# Patient Record
Sex: FEMALE | Race: WHITE | NOT HISPANIC OR LATINO | URBAN - METROPOLITAN AREA
[De-identification: names, ages, dates, MRNs, and addresses within clinical notes are randomized per-mention and may not be internally consistent; named-entity substitution may affect disease eponyms.]

---

## 2017-06-27 ENCOUNTER — IMPORTED ENCOUNTER (OUTPATIENT)
Dept: URBAN - METROPOLITAN AREA CLINIC 50 | Facility: CLINIC | Age: 67
End: 2017-06-27

## 2020-08-19 ENCOUNTER — IMPORTED ENCOUNTER (OUTPATIENT)
Dept: URBAN - METROPOLITAN AREA CLINIC 50 | Facility: CLINIC | Age: 70
End: 2020-08-19

## 2020-08-21 ENCOUNTER — IMPORTED ENCOUNTER (OUTPATIENT)
Dept: URBAN - METROPOLITAN AREA CLINIC 50 | Facility: CLINIC | Age: 70
End: 2020-08-21

## 2020-08-24 ENCOUNTER — IMPORTED ENCOUNTER (OUTPATIENT)
Dept: URBAN - METROPOLITAN AREA CLINIC 50 | Facility: CLINIC | Age: 70
End: 2020-08-24

## 2020-09-02 ENCOUNTER — IMPORTED ENCOUNTER (OUTPATIENT)
Dept: URBAN - METROPOLITAN AREA CLINIC 50 | Facility: CLINIC | Age: 70
End: 2020-09-02

## 2020-09-02 NOTE — PATIENT DISCUSSION
"""S/P IOL OS: [place] ZCB00 24.0 (ORA) +ORA/Femto/Arcs +Omidria. Continue post operative instructions and drops per schedule.  """

## 2020-09-08 ENCOUNTER — IMPORTED ENCOUNTER (OUTPATIENT)
Dept: URBAN - METROPOLITAN AREA CLINIC 50 | Facility: CLINIC | Age: 70
End: 2020-09-08

## 2020-09-16 ENCOUNTER — IMPORTED ENCOUNTER (OUTPATIENT)
Dept: URBAN - METROPOLITAN AREA CLINIC 50 | Facility: CLINIC | Age: 70
End: 2020-09-16

## 2020-09-21 ENCOUNTER — IMPORTED ENCOUNTER (OUTPATIENT)
Dept: URBAN - METROPOLITAN AREA CLINIC 50 | Facility: CLINIC | Age: 70
End: 2020-09-21

## 2020-09-21 NOTE — PATIENT DISCUSSION
"""S/P IOL OD: [place] [id number] 22.5 @ 80Âº +Femto +Omidria. Continue post operative instructions and drops per schedule.  """

## 2020-10-14 ENCOUNTER — IMPORTED ENCOUNTER (OUTPATIENT)
Dept: URBAN - METROPOLITAN AREA CLINIC 50 | Facility: CLINIC | Age: 70
End: 2020-10-14

## 2021-01-20 ENCOUNTER — IMPORTED ENCOUNTER (OUTPATIENT)
Dept: URBAN - METROPOLITAN AREA CLINIC 50 | Facility: CLINIC | Age: 71
End: 2021-01-20

## 2021-02-01 ENCOUNTER — IMPORTED ENCOUNTER (OUTPATIENT)
Dept: URBAN - METROPOLITAN AREA CLINIC 50 | Facility: CLINIC | Age: 71
End: 2021-02-01

## 2021-02-08 ENCOUNTER — IMPORTED ENCOUNTER (OUTPATIENT)
Dept: URBAN - METROPOLITAN AREA CLINIC 50 | Facility: CLINIC | Age: 71
End: 2021-02-08

## 2021-03-10 ENCOUNTER — IMPORTED ENCOUNTER (OUTPATIENT)
Dept: URBAN - METROPOLITAN AREA CLINIC 50 | Facility: CLINIC | Age: 71
End: 2021-03-10

## 2021-04-17 ASSESSMENT — TONOMETRY
OS_IOP_MMHG: 21
OS_IOP_MMHG: 19
OS_IOP_MMHG: 36
OD_IOP_MMHG: 17
OD_IOP_MMHG: 21
OS_IOP_MMHG: 18
OD_IOP_MMHG: 14
OS_IOP_MMHG: 18
OD_IOP_MMHG: 22
OD_IOP_MMHG: 21
OD_IOP_MMHG: 17
OS_IOP_MMHG: 7
OS_IOP_MMHG: 15
OS_IOP_MMHG: 14
OS_IOP_MMHG: 15
OS_IOP_MMHG: 16
OS_IOP_MMHG: 19
OD_IOP_MMHG: 20
OS_IOP_MMHG: 19
OD_IOP_MMHG: 18
OD_IOP_MMHG: 15
OD_IOP_MMHG: 17
OD_IOP_MMHG: 27

## 2021-04-17 ASSESSMENT — VISUAL ACUITY
OS_OTHER: 20/80. 20/400.
OD_OTHER: 20/30. 20/50.
OD_BAT: 20/30
OD_CC: 20/25-1
OS_CC: 20/25-
OD_BAT: 20/80
OD_OTHER: 20/60-. 20/100.
OD_OTHER: 20/60. >20/400.
OS_SC: 20/25-
OS_CC: J2@ 13 IN
OD_SC: 20/20
OD_CC: J1+@ 16 IN
OD_SC: 20/25+2
OD_CC: 20/25+2
OS_OTHER: 20/30. 20/60.
OS_SC: 20/30
OD_CC: J1
OD_SC: 20/20-1
OS_BAT: 20/80
OD_PH: @ 13 IN
OD_CC: 20/20-
OS_BAT: 20/80
OD_CC: J1+@ 14 IN
OD_SC: 20/40-1
OS_SC: 20/25
OS_OTHER: 20/80. 20/80.
OS_OTHER: 20/60. >20/400.
OD_CC: J1+
OS_BAT: 20/70-
OS_OTHER: 20/80. 20/80.
OD_OTHER: 20/80. 20/400.
OS_CC: 20/25
OD_SC: 20/30+
OS_CC: 20/50
OS_CC: J1+@ 14 IN
OS_CC: J1+@ 16 IN
OS_PH: @ 13 IN
OS_CC: J1
OD_CC: J2@ 13 IN
OD_BAT: 20/80
OD_OTHER: 20/80. 20/400.
OD_OTHER: 20/30. 20/50.
OD_OTHER: 20/80. 20/400.
OD_BAT: 20/60-
OS_OTHER: 20/70-. 20/200.
OD_BAT: 20/30
OS_CC: 20/25 BLURRY
OS_BAT: 20/30
OS_BAT: 20/30
OD_CC: 20/30+-
OS_BAT: 20/60
OD_BAT: 20/60
OS_CC: 20/40
OS_CC: J1+
OS_CC: 20/30-2
OS_BAT: 20/80
OD_BAT: 20/80
OS_OTHER: 20/30. 20/60.
OD_CC: 20/25
OS_CC: 20/40
OD_CC: 20/25

## 2021-09-27 ENCOUNTER — PROBLEM (OUTPATIENT)
Dept: URBAN - METROPOLITAN AREA CLINIC 49 | Facility: CLINIC | Age: 71
End: 2021-09-27

## 2021-09-27 DIAGNOSIS — H43.21: ICD-10-CM

## 2021-09-27 DIAGNOSIS — H35.372: ICD-10-CM

## 2021-09-27 DIAGNOSIS — H04.123: ICD-10-CM

## 2021-09-27 PROCEDURE — 92014 COMPRE OPH EXAM EST PT 1/>: CPT

## 2021-09-27 PROCEDURE — 92134 CPTRZ OPH DX IMG PST SGM RTA: CPT

## 2021-09-27 ASSESSMENT — TONOMETRY
OD_IOP_MMHG: 16
OS_IOP_MMHG: 16

## 2021-09-27 ASSESSMENT — VISUAL ACUITY
OS_CC: 20/25-2
OU_CC: J1+@17"
OD_CC: 20/20-2

## 2021-09-27 NOTE — PATIENT DISCUSSION
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 Clinically, these opacities are quite refractile, giving the appearance of stars (or asteroids) shining in the night arun—except that ocular asteroids are often quite mobile. The cause of asteroid hyalosis is unknown, but it has been associated with diabetes mellitus, hypertension and hypercholesterolemia. While asteroid hyalosis does not usually severely affect vision, the floating opacities can be quite annoying, and may interfere significantly with visualization and testing of the retina.

## 2021-09-27 NOTE — PATIENT DISCUSSION
Recommend referral to [facility] for further evaluation and possible treatment. Patient agrees and wants.

## 2022-09-26 ENCOUNTER — COMPREHENSIVE EXAM (OUTPATIENT)
Dept: URBAN - METROPOLITAN AREA CLINIC 49 | Facility: CLINIC | Age: 72
End: 2022-09-26

## 2022-09-26 DIAGNOSIS — H16.223: ICD-10-CM

## 2022-09-26 DIAGNOSIS — H35.372: ICD-10-CM

## 2022-09-26 DIAGNOSIS — H43.21: ICD-10-CM

## 2022-09-26 PROCEDURE — 92014 COMPRE OPH EXAM EST PT 1/>: CPT

## 2022-09-26 PROCEDURE — 92134 CPTRZ OPH DX IMG PST SGM RTA: CPT

## 2022-09-26 ASSESSMENT — VISUAL ACUITY
OS_CC: 20/30+2
OD_CC: 20/20-2
OU_CC: J1+

## 2022-09-26 ASSESSMENT — TONOMETRY
OS_IOP_MMHG: 16
OD_IOP_MMHG: 16

## 2022-09-26 NOTE — PATIENT DISCUSSION
Will refer patient to [facility] for a consult. Patient agrees and wishes to proceed.